# Patient Record
Sex: FEMALE | Race: WHITE | Employment: UNEMPLOYED | URBAN - METROPOLITAN AREA
[De-identification: names, ages, dates, MRNs, and addresses within clinical notes are randomized per-mention and may not be internally consistent; named-entity substitution may affect disease eponyms.]

---
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
## 2023-03-30 ENCOUNTER — OFFICE VISIT (OUTPATIENT)
Dept: PRIMARY CARE | Facility: CLINIC | Age: 23
End: 2023-03-30
Payer: COMMERCIAL

## 2023-03-30 VITALS
DIASTOLIC BLOOD PRESSURE: 62 MMHG | SYSTOLIC BLOOD PRESSURE: 102 MMHG | HEART RATE: 76 BPM | BODY MASS INDEX: 21.82 KG/M2 | WEIGHT: 139 LBS | OXYGEN SATURATION: 100 % | HEIGHT: 67 IN

## 2023-03-30 DIAGNOSIS — G43.711 INTRACTABLE CHRONIC MIGRAINE WITHOUT AURA AND WITH STATUS MIGRAINOSUS: Primary | ICD-10-CM

## 2023-03-30 PROCEDURE — 99204 OFFICE O/P NEW MOD 45 MIN: CPT | Performed by: FAMILY MEDICINE

## 2023-03-30 RX ORDER — SUMATRIPTAN 50 MG/1
50 TABLET, FILM COATED ORAL ONCE AS NEEDED
Qty: 9 TABLET | Refills: 11 | Status: SHIPPED | OUTPATIENT
Start: 2023-03-30 | End: 2024-04-15

## 2023-03-30 RX ORDER — PROPRANOLOL HYDROCHLORIDE 20 MG/1
20 TABLET ORAL 3 TIMES DAILY
Qty: 90 TABLET | Refills: 2 | Status: SHIPPED | OUTPATIENT
Start: 2023-03-30 | End: 2024-04-15

## 2023-03-30 ASSESSMENT — PATIENT HEALTH QUESTIONNAIRE - PHQ9
2. FEELING DOWN, DEPRESSED OR HOPELESS: NOT AT ALL
1. LITTLE INTEREST OR PLEASURE IN DOING THINGS: NOT AT ALL
SUM OF ALL RESPONSES TO PHQ9 QUESTIONS 1 AND 2: 0

## 2023-03-30 NOTE — PROGRESS NOTES
"Subjective   Patient ID: Leann Vargas is a 22 y.o. female who presents for Dizziness (Trouble focusing, and headaches).    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    Review of Systems    Objective   /62   Pulse 76   Ht 1.708 m (5' 7.25\")   Wt 63 kg (139 lb)   SpO2 100%   BMI 21.61 kg/m²     Physical Exam: GENERAL: alert, normal appearance, well hydrated, normal response to questions   HEAD: normocephalic , atraumatic  EYES: sclera white,, non injected, no discharge  EARS: normal position, external auditory canals patent, tympanic membranes normal   NOSE: normal position, nasal passages patent  MOUTH: good dentition, tongue uvula midline no swelling or lesions, tonsils non enlarged, non erythematous  NECK: supple, no adenopathy, no masses  CARDIOVASCULAR: regular rhythm, no murmurs, no ectopy  RESPIRATORY: normal respiratory pattern, no wheezing, no rales, no rhonchi  ABDOMEN: soft , non tender, no masses, no organomegaly  SKIN: no rashes, no bruising, no tattoos, no piercing   Neuro: Cranial nerves II through XI intact, DTR 2+    Assessment/Plan   Problem List Items Addressed This Visit       Intractable chronic migraine without aura and with status migrainosus - Primary    Relevant Medications    propranolol (Inderal) 20 mg tablet    SUMAtriptan (Imitrex) 50 mg tablet     Initial visit for cephalgia which is felt to be migraine in nature.  At 1 point she sounded like she had a " hemiplegic migraine.  Did discuss possible triggers activity sleep stress.  Discussed both acute treatment for headache along with prophylactic treatment.  Decided on using propranolol and sumatriptan at this time.  Discussed there are other options if this does not work she is to follow-up.  Discussed starting on propranolol 20 mg one half 2-3 times a day increasing to 1 3 times a day.  We will switch her over to propranolol long-acting 60 mg if tolerating.  May take 2 to 3 months to see if it truly benefits.  Reluctant to use lamotrigine and less patient has reliable means of birth control   Mother and sister present during the exam and discussion.  Discussed the need to take medicine as soon as she feels she is having a migraine.  Encouraged keeping migraine log

## 2023-07-24 ENCOUNTER — HOSPITAL ENCOUNTER (EMERGENCY)
Age: 23
Discharge: HOME | End: 2023-07-25
Attending: EMERGENCY MEDICINE
Payer: COMMERCIAL

## 2023-07-24 VITALS
DIASTOLIC BLOOD PRESSURE: 74 MMHG | WEIGHT: 145 LBS | OXYGEN SATURATION: 100 % | BODY MASS INDEX: 21.98 KG/M2 | HEART RATE: 93 BPM | HEIGHT: 68 IN | TEMPERATURE: 97.6 F | SYSTOLIC BLOOD PRESSURE: 128 MMHG | RESPIRATION RATE: 18 BRPM

## 2023-07-24 DIAGNOSIS — R07.81 PLEURITIC PAIN: Primary | ICD-10-CM

## 2023-07-24 PROCEDURE — 99284 EMERGENCY DEPT VISIT MOD MDM: CPT

## 2023-07-24 PROCEDURE — 93005 ELECTROCARDIOGRAM TRACING: CPT | Performed by: EMERGENCY MEDICINE

## 2023-07-24 ASSESSMENT — ENCOUNTER SYMPTOMS
COUGH: 0
VOMITING: 0
WHEEZING: 0
NAUSEA: 0
BACK PAIN: 0
ABDOMINAL DISTENTION: 0
SORE THROAT: 0
DIARRHEA: 0
SHORTNESS OF BREATH: 0

## 2023-07-24 ASSESSMENT — PAIN DESCRIPTION - ONSET: ONSET: SUDDEN

## 2023-07-24 ASSESSMENT — LIFESTYLE VARIABLES
HOW MANY STANDARD DRINKS CONTAINING ALCOHOL DO YOU HAVE ON A TYPICAL DAY: PATIENT DOES NOT DRINK
HOW OFTEN DO YOU HAVE A DRINK CONTAINING ALCOHOL: NEVER

## 2023-07-24 ASSESSMENT — PAIN SCALES - GENERAL: PAINLEVEL_OUTOF10: 5

## 2023-07-24 ASSESSMENT — PAIN DESCRIPTION - DESCRIPTORS: DESCRIPTORS: ACHING

## 2023-07-24 ASSESSMENT — PAIN - FUNCTIONAL ASSESSMENT: PAIN_FUNCTIONAL_ASSESSMENT: 0-10

## 2023-07-24 ASSESSMENT — PAIN DESCRIPTION - PAIN TYPE: TYPE: ACUTE PAIN

## 2023-07-25 ENCOUNTER — APPOINTMENT (OUTPATIENT)
Dept: GENERAL RADIOLOGY | Age: 23
End: 2023-07-25
Payer: COMMERCIAL

## 2023-07-25 LAB
EKG ATRIAL RATE: 62 BPM
EKG P AXIS: 73 DEGREES
EKG P-R INTERVAL: 138 MS
EKG Q-T INTERVAL: 414 MS
EKG QRS DURATION: 94 MS
EKG QTC CALCULATION (BAZETT): 420 MS
EKG R AXIS: 73 DEGREES
EKG T AXIS: 53 DEGREES
EKG VENTRICULAR RATE: 62 BPM

## 2023-07-25 PROCEDURE — 93010 ELECTROCARDIOGRAM REPORT: CPT | Performed by: INTERNAL MEDICINE

## 2023-07-25 PROCEDURE — 71046 X-RAY EXAM CHEST 2 VIEWS: CPT

## 2023-08-09 ENCOUNTER — TELEPHONE (OUTPATIENT)
Dept: PRIMARY CARE | Facility: CLINIC | Age: 23
End: 2023-08-09
Payer: COMMERCIAL

## 2023-08-09 DIAGNOSIS — Z20.818 PERTUSSIS EXPOSURE: Primary | ICD-10-CM

## 2023-08-09 RX ORDER — AZITHROMYCIN 250 MG/1
TABLET, FILM COATED ORAL
Qty: 6 TABLET | Refills: 0 | Status: SHIPPED | OUTPATIENT
Start: 2023-08-09 | End: 2023-08-14

## 2024-03-11 ENCOUNTER — APPOINTMENT (OUTPATIENT)
Dept: PRIMARY CARE | Facility: CLINIC | Age: 24
End: 2024-03-11
Payer: COMMERCIAL

## 2024-04-15 ENCOUNTER — OFFICE VISIT (OUTPATIENT)
Dept: PRIMARY CARE | Facility: CLINIC | Age: 24
End: 2024-04-15
Payer: COMMERCIAL

## 2024-04-15 VITALS
HEIGHT: 68 IN | HEART RATE: 74 BPM | OXYGEN SATURATION: 99 % | BODY MASS INDEX: 19.4 KG/M2 | DIASTOLIC BLOOD PRESSURE: 76 MMHG | SYSTOLIC BLOOD PRESSURE: 115 MMHG | WEIGHT: 128 LBS

## 2024-04-15 DIAGNOSIS — N91.2 AMENORRHEA: ICD-10-CM

## 2024-04-15 DIAGNOSIS — R53.83 FATIGUE, UNSPECIFIED TYPE: Primary | ICD-10-CM

## 2024-04-15 DIAGNOSIS — N92.0 MENORRHAGIA WITH REGULAR CYCLE: ICD-10-CM

## 2024-04-15 LAB
B-HCG SERPL-ACNC: <2 MIU/ML
BASOPHILS # BLD AUTO: 0.02 X10*3/UL (ref 0–0.1)
BASOPHILS NFR BLD AUTO: 0.4 %
EOSINOPHIL # BLD AUTO: 0.06 X10*3/UL (ref 0–0.7)
EOSINOPHIL NFR BLD AUTO: 1.2 %
ERYTHROCYTE [DISTWIDTH] IN BLOOD BY AUTOMATED COUNT: 12.4 % (ref 11.5–14.5)
HCT VFR BLD AUTO: 38.5 % (ref 36–46)
HGB BLD-MCNC: 12.8 G/DL (ref 12–16)
IMM GRANULOCYTES # BLD AUTO: 0 X10*3/UL (ref 0–0.7)
IMM GRANULOCYTES NFR BLD AUTO: 0 % (ref 0–0.9)
LYMPHOCYTES # BLD AUTO: 1.38 X10*3/UL (ref 1.2–4.8)
LYMPHOCYTES NFR BLD AUTO: 27.1 %
MCH RBC QN AUTO: 29.5 PG (ref 26–34)
MCHC RBC AUTO-ENTMCNC: 33.2 G/DL (ref 32–36)
MCV RBC AUTO: 89 FL (ref 80–100)
MONOCYTES # BLD AUTO: 0.39 X10*3/UL (ref 0.1–1)
MONOCYTES NFR BLD AUTO: 7.6 %
NEUTROPHILS # BLD AUTO: 3.25 X10*3/UL (ref 1.2–7.7)
NEUTROPHILS NFR BLD AUTO: 63.7 %
NRBC BLD-RTO: 0 /100 WBCS (ref 0–0)
PLATELET # BLD AUTO: 243 X10*3/UL (ref 150–450)
RBC # BLD AUTO: 4.34 X10*6/UL (ref 4–5.2)
TSH SERPL-ACNC: 2.41 MIU/L (ref 0.44–3.98)
WBC # BLD AUTO: 5.1 X10*3/UL (ref 4.4–11.3)

## 2024-04-15 PROCEDURE — 84443 ASSAY THYROID STIM HORMONE: CPT

## 2024-04-15 PROCEDURE — 84702 CHORIONIC GONADOTROPIN TEST: CPT

## 2024-04-15 PROCEDURE — 36415 COLL VENOUS BLD VENIPUNCTURE: CPT

## 2024-04-15 PROCEDURE — 99213 OFFICE O/P EST LOW 20 MIN: CPT

## 2024-04-15 PROCEDURE — 85025 COMPLETE CBC W/AUTO DIFF WBC: CPT

## 2024-04-15 ASSESSMENT — PATIENT HEALTH QUESTIONNAIRE - PHQ9
1. LITTLE INTEREST OR PLEASURE IN DOING THINGS: NOT AT ALL
2. FEELING DOWN, DEPRESSED OR HOPELESS: NOT AT ALL
SUM OF ALL RESPONSES TO PHQ9 QUESTIONS 1 AND 2: 0

## 2024-04-15 NOTE — PROGRESS NOTES
"Subjective   Patient ID: Leann Vargas is a 23 y.o. female who presents for Fatigue.  HPI  Leann presents for check up   Last visit with Dr. Keenan - 3/30/23  Fatigue has been like this the last couple years -no energy to do too much  If she does have energy it lasts for 10 minutes  Could always sleep, sleep is not restful  No mood concerns  Stable weight   Appetite - sometimes can eat a lot and other times cannot eat at all   Sometimes lots of hair comes out when she brushes her hair, this has been getting worse   No dry or breaking nails   No palpitations or SOB  No diarrhea or constipation   Has had mono - she was 16, fatigued started after that     LMP - very heavy at times - soaking through a pad an hour at times, 5-6 days is length, first day is bad cramps, after that it is very heavy until the 5th day - gotten a lot worse within the last year or so; period is pretty on track usually, this time it is late 3 days - she would like blood pregnancy test not urine  Also a little nauseous today     Brother has thyroid issues - has to take medication every day, has had to since he was born     History reviewed. No pertinent surgical history.   History reviewed. No pertinent past medical history.  Social History     Tobacco Use    Smoking status: Former     Types: Cigarettes    Smokeless tobacco: Never   Vaping Use    Vaping status: Every Day    Substances: Nicotine    Devices: Disposable        Review of Systems  10 point review of systems performed and is negative except as noted in the HPI.    No current outpatient medications on file.     Objective   /76   Pulse 74   Ht 1.715 m (5' 7.5\")   Wt 58.1 kg (128 lb)   SpO2 99%   BMI 19.75 kg/m²     Physical Exam  Constitutional:       General: She is not in acute distress.     Appearance: Normal appearance. She is not ill-appearing or toxic-appearing.   HENT:      Head: Normocephalic and atraumatic.      Right Ear: Tympanic membrane, ear canal and external " ear normal.      Left Ear: Tympanic membrane, ear canal and external ear normal.      Nose: Nose normal. No congestion or rhinorrhea.      Mouth/Throat:      Mouth: Mucous membranes are moist.      Pharynx: Oropharynx is clear. No oropharyngeal exudate or posterior oropharyngeal erythema.   Eyes:      Conjunctiva/sclera: Conjunctivae normal.      Pupils: Pupils are equal, round, and reactive to light.   Neck:      Vascular: No carotid bruit.   Cardiovascular:      Rate and Rhythm: Normal rate and regular rhythm.      Pulses: Normal pulses.      Heart sounds: Normal heart sounds. No murmur heard.  Pulmonary:      Effort: Pulmonary effort is normal.      Breath sounds: Normal breath sounds. No wheezing, rhonchi or rales.   Abdominal:      General: Bowel sounds are normal. There is no distension.      Palpations: Abdomen is soft. There is no mass.      Tenderness: There is no abdominal tenderness. There is no guarding or rebound.   Musculoskeletal:         General: Normal range of motion.      Cervical back: Normal range of motion and neck supple. No tenderness.      Right lower leg: No edema.      Left lower leg: No edema.   Lymphadenopathy:      Cervical: No cervical adenopathy.   Skin:     General: Skin is warm and dry.      Findings: No rash.   Neurological:      Mental Status: She is alert and oriented to person, place, and time.   Psychiatric:         Mood and Affect: Mood normal.         Behavior: Behavior normal.         Assessment/Plan   Problem List Items Addressed This Visit    None  Visit Diagnoses       Fatigue, unspecified type    -  Primary    Relevant Orders    TSH with reflex to Free T4 if abnormal    CBC and Auto Differential    Menorrhagia with regular cycle        Relevant Orders    TSH with reflex to Free T4 if abnormal    CBC and Auto Differential    Amenorrhea        Relevant Orders    HCG, quantitative, pregnancy          Fatigue and menorrhagia   TSH   Brother has had thyroid issues since birth  and has to take medication - patient does not know name of condition   Check CBC as well - patient has heavy periods     Patient is 3 days late on her period - would like pregnancy test - declines urine pregnancy test would prefer blood work    She is not taking the propranolol or the sumatriptan    Discussed at visit any disease processes that were of concern as well as the risks, benefits and instructions on any new medication provided. Patient (and/or caretaker of patient if present) stated all questions were answered, and they voiced understanding of instructions.

## 2024-07-08 ENCOUNTER — APPOINTMENT (OUTPATIENT)
Dept: PRIMARY CARE | Facility: CLINIC | Age: 24
End: 2024-07-08
Payer: COMMERCIAL

## 2024-09-30 ENCOUNTER — APPOINTMENT (OUTPATIENT)
Dept: OBSTETRICS AND GYNECOLOGY | Facility: CLINIC | Age: 24
End: 2024-09-30
Payer: COMMERCIAL